# Patient Record
Sex: FEMALE | Race: WHITE | URBAN - NONMETROPOLITAN AREA
[De-identification: names, ages, dates, MRNs, and addresses within clinical notes are randomized per-mention and may not be internally consistent; named-entity substitution may affect disease eponyms.]

---

## 2017-09-28 ENCOUNTER — OFFICE VISIT (OUTPATIENT)
Dept: FAMILY MEDICINE | Facility: OTHER | Age: 82
End: 2017-09-28
Payer: MEDICARE

## 2017-09-28 VITALS
DIASTOLIC BLOOD PRESSURE: 78 MMHG | TEMPERATURE: 96.1 F | RESPIRATION RATE: 16 BRPM | HEART RATE: 76 BPM | SYSTOLIC BLOOD PRESSURE: 122 MMHG

## 2017-09-28 DIAGNOSIS — N76.0 BV (BACTERIAL VAGINOSIS): ICD-10-CM

## 2017-09-28 DIAGNOSIS — R30.0 DYSURIA: Primary | ICD-10-CM

## 2017-09-28 DIAGNOSIS — N76.0 ACUTE VAGINITIS: ICD-10-CM

## 2017-09-28 DIAGNOSIS — B96.89 BV (BACTERIAL VAGINOSIS): ICD-10-CM

## 2017-09-28 LAB
ALBUMIN UR-MCNC: NEGATIVE MG/DL
AMORPH CRY #/AREA URNS HPF: ABNORMAL /HPF
APPEARANCE UR: CLEAR
BACTERIA #/AREA URNS HPF: ABNORMAL /HPF
BILIRUB UR QL STRIP: NEGATIVE
COLOR UR AUTO: YELLOW
GLUCOSE UR STRIP-MCNC: NEGATIVE MG/DL
HGB UR QL STRIP: NEGATIVE
HYALINE CASTS #/AREA URNS LPF: ABNORMAL /LPF
KETONES UR STRIP-MCNC: NEGATIVE MG/DL
LEUKOCYTE ESTERASE UR QL STRIP: ABNORMAL
MUCOUS THREADS #/AREA URNS LPF: PRESENT /LPF
NITRATE UR QL: NEGATIVE
PH UR STRIP: 5.5 PH (ref 5–7)
RBC #/AREA URNS AUTO: ABNORMAL /HPF
SOURCE: ABNORMAL
SP GR UR STRIP: 1.02 (ref 1–1.03)
SPECIMEN SOURCE: ABNORMAL
UROBILINOGEN UR STRIP-ACNC: 0.2 EU/DL (ref 0.2–1)
WBC #/AREA URNS AUTO: ABNORMAL /HPF
WET PREP SPEC: ABNORMAL

## 2017-09-28 PROCEDURE — 87210 SMEAR WET MOUNT SALINE/INK: CPT | Performed by: PHYSICIAN ASSISTANT

## 2017-09-28 PROCEDURE — 99214 OFFICE O/P EST MOD 30 MIN: CPT | Performed by: PHYSICIAN ASSISTANT

## 2017-09-28 PROCEDURE — 81001 URINALYSIS AUTO W/SCOPE: CPT | Performed by: PHYSICIAN ASSISTANT

## 2017-09-28 RX ORDER — METRONIDAZOLE 500 MG/1
500 TABLET ORAL 2 TIMES DAILY
Qty: 14 TABLET | Refills: 0 | Status: SHIPPED | OUTPATIENT
Start: 2017-09-28

## 2017-09-28 RX ORDER — CLINDAMYCIN PHOSPHATE 20 MG/G
1 CREAM VAGINAL AT BEDTIME
Qty: 40 G | Refills: 0 | Status: SHIPPED | OUTPATIENT
Start: 2017-09-28 | End: 2017-09-28 | Stop reason: ALTCHOICE

## 2017-09-28 ASSESSMENT — PAIN SCALES - GENERAL: PAINLEVEL: NO PAIN (0)

## 2017-09-28 NOTE — LETTER
[facility]  [address] [facility] [address]  [phone]        September 28, 2017    Colleen Garvin  [address] [address] [address]          Dear Colleen,    Enclosed is a copy of your labs from 9-.  The result is as follows: Urine came back and showed no acute bladder infection.  If urinary symptoms continue next week follow up with a recheck of the urine.  If you have any questions or problems, please give us a call at the clinic.  Sincerely,        Kennedi Crane PA-C/[facility]

## 2017-09-28 NOTE — PATIENT INSTRUCTIONS
There is evidence of a bacterial infection in the vagina. I will have you treat with a vaginal cream at bedtime for 7 days. I will have you bring a urine back to clinic so we can evaluate for urinary infection and we will contact you with results.    I would also have you follow up for a general physical as scheduled.

## 2017-09-28 NOTE — PROGRESS NOTES
SUBJECTIVE:   Colleen Garvin is a 89 year old female who presents to clinic today for the following health issues:      URINARY TRACT SYMPTOMS  Onset: 2 months    Description:   Painful urination (Dysuria): YES  Blood in urine (Hematuria): no   Delay in urine (Hesitency): YES    Intensity: mild    Progression of Symptoms:  same    Accompanying Signs & Symptoms:  Fever/chills: no   Flank pain no   Nausea and vomiting: no   Any vaginal symptoms: vaginal discharge  Abdominal/Pelvic Pain: no     History:   History of frequent UTI's: no   History of kidney stones: no   Sexually Active: no   Possibility of pregnancy: No    Precipitating factors:   nothing    Therapies Tried and outcome: nothing    Coni is here in clinic with her son who is her caregiver, she has been having increased urinary urgency and has been complaining of pelvic pain and pain with urination. [place] but [place] provider left that clinic so they are scheduled to establish care with internal medicine in this clinic later this month. Patient is wheelchair bound and does not bear weight, she lives with her son who uses a gaviota lift to transfer her. They have noted some discharge and odor to the urine and are concerned about possible infection vs. Irritation to the vaginal area.     -------------------------------------    Problem list and histories reviewed & adjusted, as indicated.  Additional history: as documented    BP Readings from Last 3 Encounters:   09/28/17 122/78    Wt Readings from Last 3 Encounters:   No data found for Wt           Reviewed and updated as needed this visit by clinical staffAllergies  Meds  Problems       Reviewed and updated as needed this visit by Provider  Allergies  Meds  Problems         ROS:  No fevers or chills, no abdominal pain or back pain, no nausea or vomiting, some changes in bowel habits.     OBJECTIVE:     /78 (BP Location: Right arm, Patient Position: Chair, Cuff Size:  Adult Regular)  Pulse 76  Temp 96.1  F (35.6  C) (Oral)  Resp 16  There is no height or weight on file to calculate BMI.  GENERAL: frail, elderly and wheelchair bound female in NAD  RESP: lungs clear to auscultation - no rales, rhonchi or wheezes  CV: regular rates and rhythm  ABDOMEN: soft, nontender, no hepatosplenomegaly, no masses and bowel sounds normal   (female): normal female external genitalia and mild external erythema, no noted discharge or odor on exam     Diagnostic Test Results:  Results for orders placed or performed in visit on 09/28/17 (from the past 24 hour(s))   Wet prep   Result Value Ref Range    Specimen Description Vagina     Wet Prep No Trichomonas seen     Wet Prep No yeast seen     Wet Prep Clue cells seen (A)      UA- will be returned to clinic later    ASSESSMENT/PLAN:       ICD-10-CM    1. Dysuria R30.0 *UA reflex to Microscopic and Culture ([facility] (except [place] and [place])     Wet prep     **UA reflex to Microscopic FUTURE 14d   2. Acute vaginitis N76.0 Wet prep   3. BV (bacterial vaginosis) N76.0 clindamycin (CLEOCIN) 2 % cream    B96.89        I discussed symptoms and care with patients son and caregiver. Per his request I will treat BV with topical clindamycin cream for 7 days and will follow up with UA result once we have it.   I will have them go ahead and establish care as scheduled.   See Patient Instructions    Kennedi Crane PA-C  [facility]

## 2017-09-28 NOTE — MR AVS SNAPSHOT
After Visit Summary   9/28/2017    Colleen Garvin    MRN: [id number]           Patient Information     Date Of Birth          12/28/1927        Visit Information        Provider Department      9/28/2017 10:20 AM Kennedi Crane PA-C [facility]        Today's Diagnoses     Dysuria    -  1    Acute vaginitis        BV (bacterial vaginosis)          Care Instructions    There is evidence of a bacterial infection in the vagina. I will have you treat with a vaginal cream at bedtime for 7 days. I will have you bring a urine back to clinic so we can evaluate for urinary infection and we will contact you with results.    I would also have you follow up for a general physical as scheduled.           Follow-ups after your visit        Follow-up notes from your care team     Return if symptoms worsen or fail to improve.      Your next 10 appointments already scheduled     Oct 11, 2017 10:20 AM CDT   [facility] Visit with Maulik Perry DO   [facility]    [address] [facility] [address] [phone]           Bring a current list of meds and any records pertaining to this visit. For Physicals, please bring immunization records and any forms needing to be filled out. Please arrive 10 minutes early to complete paperwork.              Future tests that were ordered for you today     Open Future Orders        Priority Expected Expires Ordered    **UA reflex to Microscopic FUTURE 14d Routine 10/5/2017 10/12/2017 9/28/2017            Who to contact     If you have questions or need follow up information about today's clinic visit or your schedule please contact [facility] directly at [phone].  Normal or non-critical lab and imaging results will be communicated to you by MyChart, letter or phone within 4 business days after the clinic has received the results. If you do not hear from us within 7 days, please contact the clinic  "through [email or web address] or phone. If you have a critical or abnormal lab result, we will notify you by phone as soon as possible.  Submit refill requests through [email or web address] or call your pharmacy and they will forward the refill request to us. Please allow 3 business days for your refill to be completed.          Additional Information About Your Visit        Interwisehart Information     [email or web address] lets you send messages to your doctor, view your test results, renew your prescriptions, schedule appointments and more. To sign up, go to [email or web address] . Click on \"Log in\" on the left side of the screen, which will take you to the Welcome page. Then click on \"Sign up Now\" on the right side of the page.     You will be asked to enter the access code listed below, as well as some personal information. Please follow the directions to create your username and password.     Your access code is: [email or web address]  Expires: 2017 11:32 AM     Your access code will  in 90 days. If you need help or a new code, please call your [facility] clinic or [phone].        Care EveryWhere ID     This is your Care EveryWhere ID. This could be used by other organizations to access your [facility] medical records  [id number]        Your Vitals Were     Pulse Temperature Respirations             76 96.1  F (35.6  C) (Oral) 16          Blood Pressure from Last 3 Encounters:   17 122/78    Weight from Last 3 Encounters:   No data found for Wt              We Performed the Following     *UA reflex to Microscopic and Culture ([facility] (except [place] and [place])     Wet prep          Today's Medication Changes          These changes are accurate as of: 17 11:32 AM.  If you have any questions, ask your nurse or doctor.               Start taking these medicines.        Dose/Directions    clindamycin 2 % cream   Commonly known as:  CLEOCIN   Used for:  BV (bacterial vaginosis)   Started by:  Kennedi Crane PA-C "        Dose:  1 applicator   Place 1 applicator vaginally At Bedtime for 5 days   Quantity:  40 g   Refills:  0            Where to get your medicines      These medications were sent to [id number] - [place], [place] - [address] [place] [address]  [address] [place] [address]    Hours:  M-F 8:30-6:30; Sat 9-4; closed Sunday Phone:  [phone]     clindamycin 2 % cream                Primary Care Provider    None Specified       No primary provider on file.        Equal Access to Services     EDUARDO CAST : Hadii aad ku hadasho Soomaali, waaxda luqadaha, qaybta kaalmada adeegyada, waxay idiin hayaan adeeg sanjuanita zhao . So [facility] [phone].    ATENCIÓN: Si habla español, tiene a norris disposición servicios gratuitos de asistencia lingüística. Ll[facility] [phone].    We comply with applicable federal civil rights laws and [place] laws. We do not discriminate on the basis of race, color, national origin, age, disability sex, sexual orientation or gender identity.            Thank you!     Thank you for choosing [facility]  for your care. Our goal is always to provide you with excellent care. Hearing back from our patients is one way we can continue to improve our services. Please take a few minutes to complete the written survey that you may receive in the mail after your visit with us. Thank you!             Your Updated Medication List - Protect others around you: Learn how to safely use, store and throw away your medicines at www.disposemymeds.org.          This list is accurate as of: 9/28/17 11:32 AM.  Always use your most recent med list.                   Brand Name Dispense Instructions for use Diagnosis    ALEVE PO      As needed        clindamycin 2 % cream    CLEOCIN    40 g    Place 1 applicator vaginally At Bedtime for 5 days    BV (bacterial vaginosis)       * NEW MED      Depression medication        * NEW MED      Cough medicine        OMEPRAZOLE PO           SYNTHROID PO           *  Notice:  This list has 2 medication(s) that are the same as other medications prescribed for you. Read the directions carefully, and ask your doctor or other care provider to review them with you.

## 2017-09-28 NOTE — NURSING NOTE
Chief Complaint   Patient presents with     UTI     2 months       Initial /78 (BP Location: Right arm, Patient Position: Chair, Cuff Size: Adult Regular)  Pulse 76  Temp 96.1  F (35.6  C) (Oral)  Resp 16 There is no height or weight on file to calculate BMI.  Medication Reconciliation: kennedy ZHOU LPN